# Patient Record
Sex: FEMALE | Race: WHITE | Employment: OTHER | URBAN - METROPOLITAN AREA
[De-identification: names, ages, dates, MRNs, and addresses within clinical notes are randomized per-mention and may not be internally consistent; named-entity substitution may affect disease eponyms.]

---

## 2021-04-02 ENCOUNTER — TELEPHONE (OUTPATIENT)
Dept: CARDIOLOGY CLINIC | Age: 81
End: 2021-04-02

## 2021-04-12 NOTE — PROGRESS NOTES
Cardiology Consultation     Referring Physician: Dr. Cassia Valdes   Reason for Consultation: PAD/toe pain     Chief Complaint:   Chief Complaint   Patient presents with    New Patient     states she has always had cold feet/foot pain, says lately she wakes up and they feel hot         Subjective:   History of Present Illness:  Kayleigh Baltazar is a [de-identified] y.o. female who presents with the above complaint. Today, she reports that she told Dr. Cassia Valdes that her toes hurt off/on. She was being fitted for orthotics. Tips of left toes spotted red, chronic issue. Tops of feet bother her when she laces up her gym shoes tight. She is walking 1 miiles walk, 5 days per week with incline. She does not report s/s of claudication with activity. She has right thigh pain at rest. Patient denies exertional chest pain/pressure, dyspnea at rest, RIZO, PND, orthopnea, palpitations, lightheadedness, weight changes, changes in LE edema, and syncope. She admits to medical therapy compliance and tolerating. 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 ~53 bpm    No prior hx   Echo:   Stress Test:   Cath:       Past Medical History:   has a past medical history of Compression fracture, Depression, Glaucoma, Hyperlipidemia, Iron deficiency anemia, Psoriasis, and Sinusitis. Surgical History:   has a past surgical history that includes Myringotomy Tympanostomy Tube Placement (Left, ); Cataract removal with implant (Bilateral); Tonsillectomy (age 4); Dilation and curettage of uterus; Colonoscopy (10/17/05); Upper gastrointestinal endoscopy (12/1/15); and Colonoscopy (12/1/15). Social History:   reports that she has never smoked. She has never used smokeless tobacco. She reports current alcohol use. She reports that she does not use drugs. Family History:  family history includes Cancer in her brother, daughter, father, and mother.     Home Medications:  Were reviewed and are listed in nursing record and/or below  Prior to Admission medications    Medication Sig Start Date End Date Taking? Authorizing Provider   escitalopram (LEXAPRO) 10 MG tablet Take 10 mg by mouth daily   Yes Historical Provider, MD   dorzolamide (TRUSOPT) 2 % ophthalmic solution 2 drops 3 times daily   Yes Historical Provider, MD   Netarsudil Dimesylate (RHOPRESSA) 0.02 % SOLN Apply to eye   Yes Historical Provider, MD   LORazepam (ATIVAN) 0.5 MG tablet Take 0.5 mg by mouth every 6 hours as needed for Anxiety. Yes Historical Provider, MD   clobetasol (TEMOVATE) 0.05 % cream Apply topically 2 times daily Apply topically 2 times daily. Yes Historical Provider, MD   TIMOLOL MALEATE OP Apply 1 drop to eye 2 times daily   Yes Historical Provider, MD   calcium carbonate (OSCAL) 500 MG TABS tablet Take 1,000 mg by mouth daily   Yes Historical Provider, MD   Vitamin D (CHOLECALCIFEROL) 1000 UNITS CAPS capsule Take 1,000 Units by mouth daily   Yes Historical Provider, MD   BIOTIN PO Take 1 tablet by mouth daily   Yes Historical Provider, MD   Multiple Vitamins-Iron (MULTIVITAMIN/IRON PO) Take 1 tablet by mouth daily   Yes Historical Provider, MD   calcipotriene (DOVONEX) 0.005 % ointment Apply topically 2 times daily Apply topically 2 times daily. Yes Historical Provider, MD   simvastatin (ZOCOR) 20 MG tablet Take 20 mg by mouth nightly    Historical Provider, MD   Cetirizine HCl (ZYRTEC ALLERGY PO) Take 1 tablet by mouth daily    Historical Provider, MD          Allergies:  Patient has no known allergies. Review of Systems:   · Constitutional: no unanticipated weight loss. There's been no change in energy level, sleep pattern, or activity level. No fevers, chills. · Eyes: No visual changes or diplopia. No scleral icterus. · ENT: No Headaches, hearing loss or vertigo. No mouth sores or sore throat.   · Cardiovascular: as reviewed in HPI  · Respiratory: No cough or wheezing, no sputum production. No hemoptysis. · Gastrointestinal: No abdominal pain, appetite loss, blood in stools. No change in bowel or bladder habits. · Genitourinary: No dysuria, trouble voiding, or hematuria. · Musculoskeletal:  No gait disturbance, no joint complaints. · Integumentary: No rash or pruritis. · Neurological: No headache, diplopia, change in muscle strength, numbness or tingling. · Psychiatric: No anxiety or depression. · Endocrine: No temperature intolerance. No excessive thirst, fluid intake, or urination. No tremor. · Hematologic/Lymphatic: No abnormal bruising or bleeding, blood clots or swollen lymph nodes. · Allergic/Immunologic: No nasal congestion or hives. Objective:   PHYSICAL EXAM:    Vitals:    04/13/21 1225   BP: 100/64   Pulse: 55   SpO2: 100%    Weight: 132 lb 3.2 oz (60 kg)       General Appearance:  Alert, cooperative, no distress, appears stated age. Head:  Normocephalic, without obvious abnormality, atraumatic. Eyes:  Pupils equal and round. No scleral icterus. Mouth: Moist mucosa, no pharyngeal erythema. Nose: Nares normal. No drainage or sinus tenderness. Neck: Supple, symmetrical, trachea midline. No adenopathy. No tenderness/mass/nodules. No carotid bruit or elevated JVD. Lungs:   Clear to auscultation bilaterally, respirations unlabored. No wheeze, rales, or rhonchi. Chest Wall:  No tenderness or deformity. Heart:  Regular rate. S1/S2 normal. No murmur, rub, or gallop. Abdomen:   Soft, non-tender, bowel sounds active. Musculoskeletal: No muscle wasting or digital clubbing. Extremities: Extremities normal, atraumatic. No cyanosis or edema. Pulses: 2+ radial and carotid pulses, symmetric. Skin: No rashes or lesions. Pysch: Normal mood and affect. Alert and oriented x 4.    Neurologic: Normal gross motor and sensory exam.       Labs     CareEverywhere: [facility] 9/17/20  No results found for: WBC, RBC, HGB, HCT, MCV, RDW, PLT  :No results found for: NA, K, CL, CO2, BUN, CREATININE, GFRAA, AGRATIO, LABGLOM, GLUCOSE, PROT, CALCIUM, BILITOT, ALKPHOS, AST, ALT   No results found for: PTINR  No results found for: LABA1C   No results found for: TROPONINI      CURRENT Medications:  Current Outpatient Medications on File Prior to Visit   Medication Sig Dispense Refill    escitalopram (LEXAPRO) 10 MG tablet Take 10 mg by mouth daily      dorzolamide (TRUSOPT) 2 % ophthalmic solution 2 drops 3 times daily      Netarsudil Dimesylate (RHOPRESSA) 0.02 % SOLN Apply to eye      LORazepam (ATIVAN) 0.5 MG tablet Take 0.5 mg by mouth every 6 hours as needed for Anxiety.  clobetasol (TEMOVATE) 0.05 % cream Apply topically 2 times daily Apply topically 2 times daily.  TIMOLOL MALEATE OP Apply 1 drop to eye 2 times daily      calcium carbonate (OSCAL) 500 MG TABS tablet Take 1,000 mg by mouth daily      Vitamin D (CHOLECALCIFEROL) 1000 UNITS CAPS capsule Take 1,000 Units by mouth daily      BIOTIN PO Take 1 tablet by mouth daily      Multiple Vitamins-Iron (MULTIVITAMIN/IRON PO) Take 1 tablet by mouth daily      calcipotriene (DOVONEX) 0.005 % ointment Apply topically 2 times daily Apply topically 2 times daily.  simvastatin (ZOCOR) 20 MG tablet Take 20 mg by mouth nightly      Cetirizine HCl (ZYRTEC ALLERGY PO) Take 1 tablet by mouth daily       No current facility-administered medications on file prior to visit. Assessment and Plan   1) PAD   Toe pain, bilateral referred per Dr. Sundar Snider   Tips of right toes spotted red  Cold feet at night, sleeps with socks on  Possible claudication symptoms   2+ distal palpable pulses bilaterally. Walking 1 miles with incline, 5 days per week without symptoms of claudication  After shoveling snow, purple BLE turn red-has not been formerly diagnosed with Raynaud's syndrome. JAVIER now to further assess. 2) Hyperlipidemia   On zocor without myalgia sounding symptoms. Lat lipid profile wnl except LDLc 121, . Encouraged low fat/chol/carb diet. 3) Iron deficiency anemia   9/17/20 wnl.     4) anxiety/depression  On medical therapy. We will call with test results and plan follow up PRN at our [facility] location. Thank you for allowing us to participate in the care of Baron Worthington    This note was scribed in the presence of Dr. Osmin Herrera MD by Gin Copeland RN. Venessa St MD [address] [facility]   [facility] [address]   (C): [phone]  [facility]): [phone]     Physician Attestation:  The scribes documentation has been prepared under my direction and personally reviewed by me in its entirety. I confirm the note above accurately reflects all work, treatment, procedures, and medical decision making performed by me.     Electronically signed by Romeo Ho MD on 4/21/2021 at 7:38 AM

## 2021-04-13 ENCOUNTER — OFFICE VISIT (OUTPATIENT)
Dept: CARDIOLOGY CLINIC | Age: 81
End: 2021-04-13
Payer: MEDICARE

## 2021-04-13 VITALS
BODY MASS INDEX: 22.57 KG/M2 | DIASTOLIC BLOOD PRESSURE: 64 MMHG | WEIGHT: 132.2 LBS | HEIGHT: 64 IN | SYSTOLIC BLOOD PRESSURE: 100 MMHG | OXYGEN SATURATION: 100 % | HEART RATE: 55 BPM

## 2021-04-13 DIAGNOSIS — I73.9 PAD (PERIPHERAL ARTERY DISEASE) (HCC): Primary | ICD-10-CM

## 2021-04-13 DIAGNOSIS — R20.9 COLD FEET: ICD-10-CM

## 2021-04-13 DIAGNOSIS — E78.5 HYPERLIPIDEMIA, UNSPECIFIED HYPERLIPIDEMIA TYPE: ICD-10-CM

## 2021-04-13 DIAGNOSIS — M79.674 PAIN IN TOES OF BOTH FEET: ICD-10-CM

## 2021-04-13 DIAGNOSIS — I73.9 CLAUDICATION (HCC): ICD-10-CM

## 2021-04-13 DIAGNOSIS — M79.675 PAIN IN TOES OF BOTH FEET: ICD-10-CM

## 2021-04-13 PROCEDURE — 1090F PRES/ABSN URINE INCON ASSESS: CPT | Performed by: INTERNAL MEDICINE

## 2021-04-13 PROCEDURE — G8400 PT W/DXA NO RESULTS DOC: HCPCS | Performed by: INTERNAL MEDICINE

## 2021-04-13 PROCEDURE — 1036F TOBACCO NON-USER: CPT | Performed by: INTERNAL MEDICINE

## 2021-04-13 PROCEDURE — 1123F ACP DISCUSS/DSCN MKR DOCD: CPT | Performed by: INTERNAL MEDICINE

## 2021-04-13 PROCEDURE — 99204 OFFICE O/P NEW MOD 45 MIN: CPT | Performed by: INTERNAL MEDICINE

## 2021-04-13 PROCEDURE — G8420 CALC BMI NORM PARAMETERS: HCPCS | Performed by: INTERNAL MEDICINE

## 2021-04-13 PROCEDURE — 4040F PNEUMOC VAC/ADMIN/RCVD: CPT | Performed by: INTERNAL MEDICINE

## 2021-04-13 PROCEDURE — G8427 DOCREV CUR MEDS BY ELIG CLIN: HCPCS | Performed by: INTERNAL MEDICINE

## 2021-04-13 PROCEDURE — 93000 ELECTROCARDIOGRAM COMPLETE: CPT | Performed by: INTERNAL MEDICINE

## 2021-04-13 RX ORDER — DORZOLAMIDE HCL 20 MG/ML
2 SOLUTION/ DROPS OPHTHALMIC 3 TIMES DAILY
COMMUNITY

## 2021-04-13 RX ORDER — CLOBETASOL PROPIONATE 0.5 MG/G
CREAM TOPICAL 2 TIMES DAILY
COMMUNITY

## 2021-04-13 RX ORDER — ESCITALOPRAM OXALATE 10 MG/1
10 TABLET ORAL DAILY
COMMUNITY

## 2021-04-13 RX ORDER — NETARSUDIL 0.2 MG/ML
SOLUTION/ DROPS OPHTHALMIC; TOPICAL
COMMUNITY

## 2021-04-13 RX ORDER — LORAZEPAM 0.5 MG/1
0.5 TABLET ORAL EVERY 6 HOURS PRN
COMMUNITY

## 2021-04-19 ENCOUNTER — HOSPITAL ENCOUNTER (OUTPATIENT)
Dept: VASCULAR LAB | Age: 81
Discharge: HOME | End: 2021-04-19
Payer: MEDICARE

## 2021-04-19 DIAGNOSIS — I73.9 CLAUDICATION (HCC): ICD-10-CM

## 2021-04-19 DIAGNOSIS — R20.9 COLD FEET: ICD-10-CM

## 2021-04-19 DIAGNOSIS — M79.674 PAIN IN TOES OF BOTH FEET: ICD-10-CM

## 2021-04-19 DIAGNOSIS — M79.675 PAIN IN TOES OF BOTH FEET: ICD-10-CM

## 2021-04-19 PROCEDURE — 93922 UPR/L XTREMITY ART 2 LEVELS: CPT

## 2021-05-05 NOTE — RESULT ENCOUNTER NOTE
Please notify patient that their leg ultrasound study shows mild disease at the arch. Recommend medical therapy with with asa/statin and nitro paste ( q12 h to the top of the foot) .

## 2023-02-15 ENCOUNTER — TELEPHONE (OUTPATIENT)
Dept: DERMATOLOGY | Age: 83
End: 2023-02-15

## 2023-02-15 NOTE — TELEPHONE ENCOUNTER
Pt is wanting to be a new pt with Dr. Jorge Juarez. Her sister Dipika Citizen  . She is stating she doesn't have any real issues right now. She says that she does have a dermatologist in [place] but it is getting harder to make the trek over there.      Pt phone is [phone]

## 2023-02-22 ENCOUNTER — TELEPHONE (OUTPATIENT)
Dept: DERMATOLOGY | Age: 83
End: 2023-02-22

## 2023-02-22 NOTE — TELEPHONE ENCOUNTER
Called pt [phone]  Offered an appt for tomorrow pt declined due to another appt  Will all back with next avail    Pt states just had a tbse